# Patient Record
Sex: MALE | Race: WHITE | NOT HISPANIC OR LATINO | Employment: FULL TIME | URBAN - METROPOLITAN AREA
[De-identification: names, ages, dates, MRNs, and addresses within clinical notes are randomized per-mention and may not be internally consistent; named-entity substitution may affect disease eponyms.]

---

## 2021-02-08 ENCOUNTER — IMMUNIZATIONS (OUTPATIENT)
Dept: FAMILY MEDICINE CLINIC | Facility: HOSPITAL | Age: 56
End: 2021-02-08

## 2021-02-08 DIAGNOSIS — Z23 ENCOUNTER FOR IMMUNIZATION: Primary | ICD-10-CM

## 2021-02-08 PROCEDURE — 91300 SARS-COV-2 / COVID-19 MRNA VACCINE (PFIZER-BIONTECH) 30 MCG: CPT

## 2021-02-08 PROCEDURE — 0001A SARS-COV-2 / COVID-19 MRNA VACCINE (PFIZER-BIONTECH) 30 MCG: CPT

## 2021-03-01 ENCOUNTER — IMMUNIZATIONS (OUTPATIENT)
Dept: FAMILY MEDICINE CLINIC | Facility: HOSPITAL | Age: 56
End: 2021-03-01

## 2021-03-01 DIAGNOSIS — Z23 ENCOUNTER FOR IMMUNIZATION: Primary | ICD-10-CM

## 2021-03-01 PROCEDURE — 91300 SARS-COV-2 / COVID-19 MRNA VACCINE (PFIZER-BIONTECH) 30 MCG: CPT

## 2021-03-01 PROCEDURE — 0002A SARS-COV-2 / COVID-19 MRNA VACCINE (PFIZER-BIONTECH) 30 MCG: CPT

## 2022-03-26 ENCOUNTER — APPOINTMENT (EMERGENCY)
Dept: RADIOLOGY | Facility: HOSPITAL | Age: 57
End: 2022-03-26
Payer: COMMERCIAL

## 2022-03-26 ENCOUNTER — HOSPITAL ENCOUNTER (EMERGENCY)
Facility: HOSPITAL | Age: 57
Discharge: HOME | End: 2022-03-26
Attending: EMERGENCY MEDICINE
Payer: COMMERCIAL

## 2022-03-26 VITALS
TEMPERATURE: 97.8 F | WEIGHT: 268.4 LBS | RESPIRATION RATE: 16 BRPM | HEART RATE: 80 BPM | DIASTOLIC BLOOD PRESSURE: 100 MMHG | SYSTOLIC BLOOD PRESSURE: 150 MMHG | OXYGEN SATURATION: 96 %

## 2022-03-26 DIAGNOSIS — K62.89 RECTAL PAIN: Primary | ICD-10-CM

## 2022-03-26 LAB
ALBUMIN SERPL BCP-MCNC: 3.1 G/DL (ref 3.5–5)
ALP SERPL-CCNC: 70 U/L (ref 46–116)
ALT SERPL W P-5'-P-CCNC: 71 U/L (ref 12–78)
ANION GAP SERPL CALCULATED.3IONS-SCNC: 2 MMOL/L (ref 4–13)
AST SERPL W P-5'-P-CCNC: 55 U/L (ref 5–45)
BASOPHILS # BLD AUTO: 0.06 THOUSANDS/ΜL (ref 0–0.1)
BASOPHILS NFR BLD AUTO: 1 % (ref 0–1)
BILIRUB SERPL-MCNC: 0.36 MG/DL (ref 0.2–1)
BUN SERPL-MCNC: 14 MG/DL (ref 5–25)
CALCIUM ALBUM COR SERPL-MCNC: 9.6 MG/DL (ref 8.3–10.1)
CALCIUM SERPL-MCNC: 8.9 MG/DL (ref 8.3–10.1)
CHLORIDE SERPL-SCNC: 105 MMOL/L (ref 100–108)
CO2 SERPL-SCNC: 30 MMOL/L (ref 21–32)
CREAT SERPL-MCNC: 1.07 MG/DL (ref 0.6–1.3)
EOSINOPHIL # BLD AUTO: 0.1 THOUSAND/ΜL (ref 0–0.61)
EOSINOPHIL NFR BLD AUTO: 1 % (ref 0–6)
ERYTHROCYTE [DISTWIDTH] IN BLOOD BY AUTOMATED COUNT: 13.2 % (ref 11.6–15.1)
GFR SERPL CREATININE-BSD FRML MDRD: 77 ML/MIN/1.73SQ M
GLUCOSE SERPL-MCNC: 84 MG/DL (ref 65–140)
HCT VFR BLD AUTO: 50.9 % (ref 36.5–49.3)
HGB BLD-MCNC: 16.2 G/DL (ref 12–17)
IMM GRANULOCYTES # BLD AUTO: 0.04 THOUSAND/UL (ref 0–0.2)
IMM GRANULOCYTES NFR BLD AUTO: 1 % (ref 0–2)
LYMPHOCYTES # BLD AUTO: 1.4 THOUSANDS/ΜL (ref 0.6–4.47)
LYMPHOCYTES NFR BLD AUTO: 17 % (ref 14–44)
MCH RBC QN AUTO: 30.5 PG (ref 26.8–34.3)
MCHC RBC AUTO-ENTMCNC: 31.8 G/DL (ref 31.4–37.4)
MCV RBC AUTO: 96 FL (ref 82–98)
MONOCYTES # BLD AUTO: 0.9 THOUSAND/ΜL (ref 0.17–1.22)
MONOCYTES NFR BLD AUTO: 11 % (ref 4–12)
NEUTROPHILS # BLD AUTO: 5.84 THOUSANDS/ΜL (ref 1.85–7.62)
NEUTS SEG NFR BLD AUTO: 69 % (ref 43–75)
NRBC BLD AUTO-RTO: 0 /100 WBCS
PLATELET # BLD AUTO: 324 THOUSANDS/UL (ref 149–390)
PMV BLD AUTO: 8.9 FL (ref 8.9–12.7)
POTASSIUM SERPL-SCNC: 4.2 MMOL/L (ref 3.5–5.3)
PROT SERPL-MCNC: 7.5 G/DL (ref 6.4–8.2)
RBC # BLD AUTO: 5.32 MILLION/UL (ref 3.88–5.62)
SODIUM SERPL-SCNC: 137 MMOL/L (ref 136–145)
WBC # BLD AUTO: 8.34 THOUSAND/UL (ref 4.31–10.16)

## 2022-03-26 PROCEDURE — 85025 COMPLETE CBC W/AUTO DIFF WBC: CPT

## 2022-03-26 PROCEDURE — G1004 CDSM NDSC: HCPCS

## 2022-03-26 PROCEDURE — 74177 CT ABD & PELVIS W/CONTRAST: CPT

## 2022-03-26 PROCEDURE — 96374 THER/PROPH/DIAG INJ IV PUSH: CPT

## 2022-03-26 PROCEDURE — 99284 EMERGENCY DEPT VISIT MOD MDM: CPT

## 2022-03-26 PROCEDURE — 36415 COLL VENOUS BLD VENIPUNCTURE: CPT

## 2022-03-26 PROCEDURE — 80053 COMPREHEN METABOLIC PANEL: CPT

## 2022-03-26 PROCEDURE — 99285 EMERGENCY DEPT VISIT HI MDM: CPT | Performed by: EMERGENCY MEDICINE

## 2022-03-26 RX ORDER — HYDROCORTISONE 25 MG/G
CREAM TOPICAL 2 TIMES DAILY
Qty: 30 G | Refills: 0 | Status: SHIPPED | OUTPATIENT
Start: 2022-03-26

## 2022-03-26 RX ORDER — FENTANYL CITRATE 50 UG/ML
50 INJECTION, SOLUTION INTRAMUSCULAR; INTRAVENOUS ONCE
Status: COMPLETED | OUTPATIENT
Start: 2022-03-26 | End: 2022-03-26

## 2022-03-26 RX ADMIN — IOHEXOL 100 ML: 350 INJECTION, SOLUTION INTRAVENOUS at 16:40

## 2022-03-26 RX ADMIN — FENTANYL CITRATE 50 MCG: 50 INJECTION, SOLUTION INTRAMUSCULAR; INTRAVENOUS at 15:38

## 2022-03-26 NOTE — DISCHARGE INSTRUCTIONS
You were seen in the ED today for anal pain and with concern for constipation  Your CT scan did not demonstrate concern for constipation  For your annual pain, I sent in a prescription for Nitrostat (2% nitroglycerin cream which relaxes and normal sphincter) in addition to Anusol which is a steroid for reducing rectal inflammation  Please take these as prescribed and contact Colorectal surgery early next week for possible earlier appointment for evaluation of your tenesmus and rectal pain

## 2022-03-26 NOTE — ED ATTENDING ATTESTATION
3/26/2022  IShira MD, saw and evaluated the patient  I have discussed the patient with the resident/non-physician practitioner and agree with the resident's/non-physician practitioner's findings, Plan of Care, and MDM as documented in the resident's/non-physician practitioner's note, except where noted  All available labs and Radiology studies were reviewed  I was present for key portions of any procedure(s) performed by the resident/non-physician practitioner and I was immediately available to provide assistance  At this point I agree with the current assessment done in the Emergency Department  I have conducted an independent evaluation of this patient a history and physical is as follows:    ED Course     Patient is a 51-year-old male recently seen and evaluated for constipation Past medical history remarkable for recent septoplasty done by urine is throat patient was started on pain medications postoperatively  Patient has developed abdominal pain distension and constipation over past weeks unrelieved with prescription medications  Patient was seen in outside hospital diagnosed with a ileus  Patient denies any nausea or vomiting does not complain of abdominal distension and bloating difficulty passing stool/flatus  Vital signs reviewed abdomen is distended with tenderness to palpation left lower quadrant    Normal bowel sounds no signs of peritonitis    Impression:  Abdominal pain constipation plan to check screening labs CTAP pain control reassess      Critical Care Time  Procedures

## 2022-03-26 NOTE — ED PROVIDER NOTES
History  Chief Complaint   Patient presents with    Constipation     Pt had surgery for deviated septum 3/16 had trouble urinating and deficating since  Seen in hospital 1 week ago where CT showed Illeus, still no relief  Has not had a stool for 1 week, c/o abdominal pain     59-year-old male presenting with chief complaint of abdominal distention and pain  Patient had a deviated septum repair on 03/16/2022 and states that since that time he has not been able to have normal bowel movements  Patient was seen previously at ED of [facility] where he was diagnosed with ileus  At the time he was given GoLYTELY and discharged home where he had scant bowel movement for less than 24 hours and then constipation again  Patient is noted over the past day and half he has become increasingly more distended and tender diffusely across his abdomen  Patient notes that at 1 point he was having difficulty with urination, however not over the past several days  He describes the sensation as pressure without radiation  He is denying any prior abdominal surgeries, and is denying any difficulties with his bowels in the past for this surgery procedure  Patient does endorse taking codeine daily since his surgery and but is denying any other narcotic  None       Past Medical History:   Diagnosis Date    Asthma        History reviewed  No pertinent surgical history  History reviewed  No pertinent family history  I have reviewed and agree with the history as documented  E-Cigarette/Vaping     E-Cigarette/Vaping Substances     Social History     Tobacco Use    Smoking status: Never Smoker    Smokeless tobacco: Never Used   Substance Use Topics    Alcohol use: Not Currently    Drug use: Not Currently        Review of Systems   Constitutional: Positive for fatigue  Negative for chills and fever  HENT: Negative for congestion and sore throat  Eyes: Negative for pain and visual disturbance  Respiratory: Negative for cough, chest tightness and shortness of breath  Cardiovascular: Negative for chest pain and palpitations  Gastrointestinal: Positive for abdominal distention and constipation  Negative for abdominal pain, blood in stool, diarrhea, nausea and vomiting  Genitourinary: Negative for dysuria, flank pain and hematuria  Musculoskeletal: Negative for arthralgias, back pain and neck pain  Skin: Negative for color change and rash  Neurological: Negative for dizziness, syncope and light-headedness  Hematological: Negative for adenopathy  Does not bruise/bleed easily  All other systems reviewed and are negative  Physical Exam  ED Triage Vitals [03/26/22 1503]   Temperature Pulse Respirations Blood Pressure SpO2   97 8 °F (36 6 °C) 105 18 (!) 171/125 94 %      Temp Source Heart Rate Source Patient Position - Orthostatic VS BP Location FiO2 (%)   Oral Monitor -- -- --      Pain Score       10 - Worst Possible Pain             Orthostatic Vital Signs  Vitals:    03/26/22 1503 03/26/22 1631 03/26/22 1743   BP: (!) 171/125 (!) 158/103 150/100   Pulse: 105 88 80       Physical Exam  Vitals and nursing note reviewed  Constitutional:       General: He is not in acute distress  Appearance: He is well-developed  He is obese  He is ill-appearing  He is not toxic-appearing or diaphoretic  Comments: Patient is uncomfortable specially when attempting to shift positions or lie flat   HENT:      Head: Normocephalic and atraumatic  Right Ear: External ear normal       Left Ear: External ear normal       Nose: Nose normal  No congestion or rhinorrhea  Mouth/Throat:      Mouth: Mucous membranes are moist       Pharynx: No oropharyngeal exudate or posterior oropharyngeal erythema  Eyes:      General: No scleral icterus  Extraocular Movements: Extraocular movements intact        Conjunctiva/sclera: Conjunctivae normal       Pupils: Pupils are equal, round, and reactive to light  Cardiovascular:      Rate and Rhythm: Regular rhythm  Tachycardia present  Pulses: Normal pulses  Heart sounds: No murmur heard  Pulmonary:      Effort: Pulmonary effort is normal  No respiratory distress  Breath sounds: Normal breath sounds  No wheezing or rales  Abdominal:      General: There is distension  Palpations: Abdomen is soft  There is no mass  Tenderness: There is abdominal tenderness (diffuse)  There is no right CVA tenderness, left CVA tenderness, guarding or rebound  Hernia: No hernia is present  Musculoskeletal:         General: No swelling, tenderness or deformity  Normal range of motion  Cervical back: Normal range of motion and neck supple  Right lower leg: No edema  Left lower leg: No edema  Skin:     General: Skin is warm and dry  Capillary Refill: Capillary refill takes less than 2 seconds  Coloration: Skin is not jaundiced  Findings: No bruising  Neurological:      General: No focal deficit present  Mental Status: He is alert and oriented to person, place, and time     Psychiatric:         Mood and Affect: Mood normal          Behavior: Behavior normal          ED Medications  Medications   fentanyl citrate (PF) 100 MCG/2ML 50 mcg (50 mcg Intravenous Given 3/26/22 1538)   iohexol (OMNIPAQUE) 350 MG/ML injection (SINGLE-DOSE) 100 mL (100 mL Intravenous Given 3/26/22 1640)       Diagnostic Studies  Results Reviewed     Procedure Component Value Units Date/Time    Comprehensive metabolic panel [[id number]]  (Abnormal) Collected: 03/26/22 1538    Lab Status: Final result Specimen: Blood from Arm, Left Updated: 03/26/22 1611     Sodium 137 mmol/L      Potassium 4 2 mmol/L      Chloride 105 mmol/L      CO2 30 mmol/L      ANION GAP 2 mmol/L      BUN 14 mg/dL      Creatinine 1 07 mg/dL      Glucose 84 mg/dL      Calcium 8 9 mg/dL      Corrected Calcium 9 6 mg/dL      AST 55 U/L      ALT 71 U/L      Alkaline Phosphatase 70 U/L      Total Protein 7 5 g/dL      Albumin 3 1 g/dL      Total Bilirubin 0 36 mg/dL      eGFR 77 ml/min/1 73sq m     Narrative:      [place] guidelines for Chronic Kidney Disease (CKD):     Stage 1 with normal or high GFR (GFR > 90 mL/min/1 73 square meters)    Stage 2 Mild CKD (GFR = 60-89 mL/min/1 73 square meters)    Stage 3A Moderate CKD (GFR = 45-59 mL/min/1 73 square meters)    Stage 3B Moderate CKD (GFR = 30-44 mL/min/1 73 square meters)    Stage 4 Severe CKD (GFR = 15-29 mL/min/1 73 square meters)    Stage 5 End Stage CKD (GFR <15 mL/min/1 73 square meters)  Note: GFR calculation is accurate only with a steady state creatinine    CBC and differential [[id number]]  (Abnormal) Collected: 03/26/22 1538    Lab Status: Final result Specimen: Blood from Arm, Left Updated: 03/26/22 1555     WBC 8 34 Thousand/uL      RBC 5 32 Million/uL      Hemoglobin 16 2 g/dL      Hematocrit 50 9 %      MCV 96 fL      MCH 30 5 pg      MCHC 31 8 g/dL      RDW 13 2 %      MPV 8 9 fL      Platelets 639 Thousands/uL      nRBC 0 /100 WBCs      Neutrophils Relative 69 %      Immat GRANS % 1 %      Lymphocytes Relative 17 %      Monocytes Relative 11 %      Eosinophils Relative 1 %      Basophils Relative 1 %      Neutrophils Absolute 5 84 Thousands/µL      Immature Grans Absolute 0 04 Thousand/uL      Lymphocytes Absolute 1 40 Thousands/µL      Monocytes Absolute 0 90 Thousand/µL      Eosinophils Absolute 0 10 Thousand/µL      Basophils Absolute 0 06 Thousands/µL                  CT abdomen pelvis with contrast   Final Result by Sharon Thomas MD (03/26 1706)      No acute findings in the abdomen or pelvis  There is only mildly prominent stool in the proximal colon in this patient with suspected constipation              Workstation performed: [id number]               Procedures  Procedures      ED Course                                       MDM  Number of Diagnoses or Management Options  Rectal pain  Diagnosis management comments: 63 yo Male is presenting with several days of constipation after his deviated septum surgery that happened on 03/16/2022  Patient states that since that time he has not had a normal bowel movements  Patient was seen prior at [facility] where he was diagnosed with ileus  At that time he was given GoLYTELY with only pellet stools he was discharged  He has not had any normal bowel movement since that time  Patient has been taking scheduled bowel regimen without any relief of his symptoms  At this time, patient is having increasing distention and abdominal pain that is concerning for progression of his ileus into other pathology  I will scan patient with IV contrast   Additionally I will obtain labs including a CBC, and metabolic panel and treat his constipation if his imaging demonstrates an ileus again  Reassessment/ disposition:  CT scan was not demonstrative of constipation or ileus  Patient is still complaining of some rectal pain and tenesmus  I have given a prescription for Anusol and nitro paste to be used sparingly for symptoms of tenesmus and rectal pain  I gave the patient a referral for colorectal surgery to evaluate for other causes of anal pain  Instructed the patient to continue his bowel regimen as prescribed as it appears to be working for him currently  I gave the patient strict return precautions and discharged him home with his prescriptions and colorectal follow-up  Disposition  Final diagnoses:   Rectal pain     Time reflects when diagnosis was documented in both MDM as applicable and the Disposition within this note     Time User Action Codes Description Comment    3/26/2022  5:30 PM Stella Ward Add [V98 33] Rectal pain       ED Disposition     ED Disposition Condition Date/Time Comment    Discharge Stable Sat Mar 26, 2022  5:37 PM Radha Castañeda discharge to home/self care              Follow-up Information Follow up With Specialties Details Why Sy Lainez MD Family Medicine In 3 days  [id number]  [id number]  [phone]            Discharge Medication List as of 3/26/2022  5:37 PM      START taking these medications    Details   hydrocortisone (ANUSOL-HC) 2 5 % rectal cream Apply topically 2 (two) times a day, Starting Sat 3/26/2022, Normal      nitroglycerin (NITRO-BID) 2 % ointment Apply 1 inch topically 2 (two) times a day, Starting Sat 3/26/2022, Normal               PDMP Review     None           ED Provider  Attending physically available and evaluated Ayazshawn Wei CHANG managed the patient along with the ED Attending      Electronically Signed by         Hiwot Thomas MD  03/26/22 5340

## 2022-03-29 PROBLEM — G25.81 RLS (RESTLESS LEGS SYNDROME): Status: ACTIVE | Noted: 2020-06-03

## 2022-03-29 PROBLEM — J45.40 MODERATE PERSISTENT ASTHMA WITHOUT COMPLICATION: Status: ACTIVE | Noted: 2019-01-14

## 2022-03-29 PROBLEM — G47.33 OBSTRUCTIVE SLEEP APNEA: Status: ACTIVE | Noted: 2017-03-22
